# Patient Record
Sex: MALE | Race: WHITE | NOT HISPANIC OR LATINO | Employment: FULL TIME | URBAN - NONMETROPOLITAN AREA
[De-identification: names, ages, dates, MRNs, and addresses within clinical notes are randomized per-mention and may not be internally consistent; named-entity substitution may affect disease eponyms.]

---

## 2017-02-17 RX ORDER — SIMVASTATIN 10 MG
10 TABLET ORAL DAILY
Refills: 2 | COMMUNITY
Start: 2017-02-03

## 2017-02-17 RX ORDER — HYDROCODONE BITARTRATE AND ACETAMINOPHEN 7.5; 325 MG/1; MG/1
1 TABLET ORAL DAILY PRN
Refills: 0 | Status: ON HOLD | COMMUNITY
Start: 2017-02-03 | End: 2018-05-31

## 2017-02-17 RX ORDER — TIZANIDINE 4 MG/1
4 TABLET ORAL DAILY PRN
Refills: 2 | COMMUNITY
Start: 2017-02-03

## 2017-02-17 RX ORDER — GABAPENTIN 800 MG/1
800 TABLET ORAL DAILY
Refills: 2 | COMMUNITY
Start: 2017-02-03

## 2017-02-17 RX ORDER — ASPIRIN 81 MG/1
81 TABLET ORAL DAILY
COMMUNITY

## 2017-02-17 RX ORDER — LISINOPRIL 30 MG/1
30 TABLET ORAL DAILY
Refills: 2 | COMMUNITY
Start: 2017-02-03

## 2017-02-21 ENCOUNTER — OFFICE VISIT (OUTPATIENT)
Dept: NEUROSURGERY | Facility: CLINIC | Age: 42
End: 2017-02-21

## 2017-02-21 VITALS — WEIGHT: 265 LBS | HEIGHT: 73 IN | BODY MASS INDEX: 35.12 KG/M2 | TEMPERATURE: 98.4 F

## 2017-02-21 DIAGNOSIS — M47.816 FACET ARTHRITIS OF LUMBAR REGION: ICD-10-CM

## 2017-02-21 DIAGNOSIS — M51.36 BULGING LUMBAR DISC: Primary | ICD-10-CM

## 2017-02-21 DIAGNOSIS — M51.36 DEGENERATIVE DISC DISEASE, LUMBAR: ICD-10-CM

## 2017-02-21 PROCEDURE — 99243 OFF/OP CNSLTJ NEW/EST LOW 30: CPT | Performed by: NEUROLOGICAL SURGERY

## 2017-02-21 RX ORDER — MELOXICAM 15 MG/1
15 TABLET ORAL DAILY
Qty: 30 TABLET | Refills: 2 | Status: SHIPPED | OUTPATIENT
Start: 2017-02-21 | End: 2017-03-08

## 2017-02-21 NOTE — PROGRESS NOTES
Patient: Jin Lara  : 1975    Primary Care Provider: Jose Montejo MD    Requesting Provider: As above        History    Chief Complaint: Low back.    History of Present Illness: Mr. Lara is a 41-year-old [place]  who for 6-8 months has had thoracolumbar pain on the right that will extend in the hip and buttock at times.  He also complains of numbness in the right buttock and right posterior thigh.  None of his symptoms seem to extend below the knee.  He's been treated with Motrin which was not helpful.  He takes 1 Norco in the late afternoon or evening and that is helpful.  He has no arm, chest, or abdominal symptoms.  He's been on Neurontin for a couple of years due to neuropathy.  He has numbness in his feet.  He denies bowel or bladder dysfunction.  His current symptoms are worse on cold and rainy days.  He does better in the early morning but is worse as the day ensues.  He's also worse with walking, lifting, twisting.    Review of Systems   Constitutional: Positive for activity change and diaphoresis. Negative for appetite change, chills, fatigue, fever and unexpected weight change.   HENT: Positive for tinnitus. Negative for congestion, dental problem, drooling, ear discharge, ear pain, facial swelling, hearing loss, mouth sores, nosebleeds, postnasal drip, rhinorrhea, sinus pressure, sneezing, sore throat, trouble swallowing and voice change.    Eyes: Negative for photophobia, pain, discharge, redness, itching and visual disturbance.   Respiratory: Negative for apnea, cough, choking, chest tightness, shortness of breath, wheezing and stridor.    Cardiovascular: Negative for chest pain, palpitations and leg swelling.   Gastrointestinal: Negative for abdominal distention, abdominal pain, anal bleeding, blood in stool, constipation, diarrhea, nausea, rectal pain and vomiting.   Endocrine: Negative for cold intolerance, heat intolerance, polydipsia, polyphagia and polyuria.  "  Genitourinary: Negative for decreased urine volume, difficulty urinating, dysuria, enuresis, flank pain, frequency, genital sores, hematuria and urgency.   Musculoskeletal: Positive for back pain, neck pain and neck stiffness. Negative for arthralgias, gait problem, joint swelling and myalgias.   Skin: Negative for color change, pallor, rash and wound.   Allergic/Immunologic: Negative for environmental allergies, food allergies and immunocompromised state.   Neurological: Positive for numbness and headaches. Negative for dizziness, tremors, seizures, syncope, facial asymmetry, speech difficulty, weakness and light-headedness.   Hematological: Negative for adenopathy. Does not bruise/bleed easily.   Psychiatric/Behavioral: Positive for agitation and sleep disturbance. Negative for behavioral problems, confusion, decreased concentration, dysphoric mood, hallucinations, self-injury and suicidal ideas. The patient is not nervous/anxious and is not hyperactive.        The patient's past medical history, past surgical history, family history, and social history have been reviewed at length in the electronic medical record.    Physical Exam:   Visit Vitals   • Temp 98.4 °F (36.9 °C)   • Ht 73\" (185.4 cm)   • Wt 265 lb (120 kg)   • BMI 34.96 kg/m2     CONSTITUTIONAL: Patient is well-nourished, pleasant and appears stated age.  CV: Heart regular rate and rhythm without murmur, rub, or gallop.  PULMONARY: Lungs are clear to ascultation.  MUSCULOSKELETAL:  Straight leg raising is negative.  Dylan's Sign is negative.  ROM in back normal.  Tenderness in the back to palpation is mildly present in the right SI region.  NEUROLOGICAL:  Orientation, memory, attention span, language function, and cognition have been examined and are intact.  Strength is intact in the lower extremities to direct testing.  Muscle tone is normal throughout.  Station and gait are normal.  Sensation is intact to light touch testing throughout.  Deep " tendon reflexes are 1+ and symmetrical.  Coordination is intact.      Medical Decision Making    Data Review:   MRI of the lumbar spine demonstrates degenerative signal change in the low thoracic region.  There is also loss of signal in the L4-5 disc.  There is some broad-based disc or spurring at L4-5 more prominent leftward.  High-grade root or canal compromise is not noted.    Diagnosis:   Mechanical low back pain due to degenerative disc and degenerative joint disease.  At this juncture I do not believe that he has an active radiculopathy.    Treatment Options:   I have referred him to physical therapy.  I've also prescribed meloxicam.  He will follow-up in my clinic in 4-6 weeks to check on his progress.  If he continues to struggle then a referral to the pain clinic for a block or two would be a consideration.       Diagnosis Plan   1. Bulging lumbar disc  Ambulatory Referral to Physical Therapy Evaluate and treat    meloxicam (MOBIC) 15 MG tablet   2. Degenerative disc disease, lumbar     3. Facet arthritis of lumbar region         Scribed for Ross Lopes MD by Lydia Morris CMA on 02/21/2017 at 10:58 AM    I, Dr. Lopes, personally performed the services described in the documentation, as scribed in my presence, and it is both accurate and complete.

## 2017-03-08 ENCOUNTER — TELEPHONE (OUTPATIENT)
Dept: NEUROSURGERY | Facility: CLINIC | Age: 42
End: 2017-03-08

## 2017-03-08 RX ORDER — CELECOXIB 200 MG/1
200 CAPSULE ORAL DAILY
Qty: 30 CAPSULE | Refills: 1 | Status: SHIPPED | OUTPATIENT
Start: 2017-03-08 | End: 2017-06-13

## 2017-03-08 NOTE — TELEPHONE ENCOUNTER
Provider:  [facility]  Caller:   Jin  Phone #:  [phone]  Surgery:  n/a  Surgery Date:  n/a  Last visit:   2/21/17    BUDDY:         Reason for call:       Pt states that the meloxicam gives him headache within 1 hour of dosage, PT suggest that he D/C the medication until speaking with us about issue

## 2017-03-08 NOTE — TELEPHONE ENCOUNTER
We can try to change this medicine to Celebrex.   No be a very unlikely.  Side effects, but we will discontinue and try different anti-inflammatory.

## 2017-03-09 NOTE — TELEPHONE ENCOUNTER
Called pt and advised that we called in Celebrex in for him in place of the Meloxicam, he understood.     He said that within an hour of taking the meloxicam he would get a migraine headache, stopped taking it for 3 days and had no headache, then took another one, headache within an hour. I asked him if he would like me to add Meloxicam to his allergy list, he said yes.     Adv pt to give us a call after taking the Celebrex for a few days if he doesn't think it's helping and/or has any side effects, he understood.

## 2017-03-13 ENCOUNTER — TELEPHONE (OUTPATIENT)
Dept: NEUROSURGERY | Facility: CLINIC | Age: 42
End: 2017-03-13

## 2017-03-13 NOTE — TELEPHONE ENCOUNTER
"Provider:  [facility]  Caller:   Jin Lara  Phone #:  [phone]  Last visit: 02/21/17    Reason for call:       Pt is on 5th visit of PT, PT staff wanted him to call to tell us that it's \"not working for him and is aggravating his symptoms\", should he continue or stop PT?  "

## 2017-03-13 NOTE — TELEPHONE ENCOUNTER
I would like him to try to continue the exercises that are helping. Avoid the things that cause increased pain .

## 2017-03-14 NOTE — TELEPHONE ENCOUNTER
Spoke to patient. Advised him to continue PT.  He states it was the physical therapist who encouraged him to call us because nothing they are doing seems to help.  He takes OTC meds prn and Hydrocodone qd.  He c/o lower back and right leg pain.  He is attending therapy 2-3 days per week.  He said he will continue to go and will discuss with Dr. Lopes at follow up.  I advised him to call us back if anything changes.

## 2017-05-02 ENCOUNTER — OFFICE VISIT (OUTPATIENT)
Dept: NEUROSURGERY | Facility: CLINIC | Age: 42
End: 2017-05-02

## 2017-05-02 VITALS — TEMPERATURE: 98.9 F | HEIGHT: 73 IN | WEIGHT: 274 LBS | BODY MASS INDEX: 36.31 KG/M2

## 2017-05-02 DIAGNOSIS — G56.03 BILATERAL CARPAL TUNNEL SYNDROME: Primary | ICD-10-CM

## 2017-05-02 DIAGNOSIS — M51.36 DEGENERATIVE DISC DISEASE, LUMBAR: ICD-10-CM

## 2017-05-02 DIAGNOSIS — M51.36 BULGING LUMBAR DISC: ICD-10-CM

## 2017-05-02 DIAGNOSIS — M47.816 FACET ARTHRITIS OF LUMBAR REGION: ICD-10-CM

## 2017-05-02 PROCEDURE — 99213 OFFICE O/P EST LOW 20 MIN: CPT | Performed by: NEUROLOGICAL SURGERY

## 2017-05-02 RX ORDER — TESTOSTERONE CYPIONATE 200 MG/ML
300 INJECTION, SOLUTION INTRAMUSCULAR WEEKLY
Refills: 3 | Status: ON HOLD | COMMUNITY
Start: 2017-03-20 | End: 2018-05-31

## 2017-05-02 RX ORDER — IBUPROFEN 600 MG/1
1 TABLET ORAL AS NEEDED
Refills: 3 | COMMUNITY
Start: 2017-04-04

## 2017-06-12 ENCOUNTER — TELEPHONE (OUTPATIENT)
Dept: PAIN MEDICINE | Facility: CLINIC | Age: 42
End: 2017-06-12

## 2017-06-13 ENCOUNTER — OFFICE VISIT (OUTPATIENT)
Dept: PAIN MEDICINE | Facility: CLINIC | Age: 42
End: 2017-06-13

## 2017-06-13 VITALS
TEMPERATURE: 97.7 F | HEART RATE: 84 BPM | SYSTOLIC BLOOD PRESSURE: 160 MMHG | HEIGHT: 73 IN | RESPIRATION RATE: 18 BRPM | DIASTOLIC BLOOD PRESSURE: 100 MMHG | OXYGEN SATURATION: 98 % | WEIGHT: 270 LBS | BODY MASS INDEX: 35.78 KG/M2

## 2017-06-13 DIAGNOSIS — M48.061 SPINAL STENOSIS OF LUMBAR REGION: ICD-10-CM

## 2017-06-13 DIAGNOSIS — E10.42 DIABETIC POLYNEUROPATHY ASSOCIATED WITH TYPE 1 DIABETES MELLITUS (HCC): ICD-10-CM

## 2017-06-13 DIAGNOSIS — M47.816 SPONDYLOSIS OF LUMBAR REGION WITHOUT MYELOPATHY OR RADICULOPATHY: ICD-10-CM

## 2017-06-13 DIAGNOSIS — E10.8 DIABETES MELLITUS TYPE 1 WITH COMPLICATIONS (HCC): ICD-10-CM

## 2017-06-13 DIAGNOSIS — M48.061 LUMBAR FORAMINAL STENOSIS: ICD-10-CM

## 2017-06-13 DIAGNOSIS — I10 ESSENTIAL HYPERTENSION: ICD-10-CM

## 2017-06-13 DIAGNOSIS — M51.26 DISPLACEMENT OF LUMBAR INTERVERTEBRAL DISC WITHOUT MYELOPATHY: ICD-10-CM

## 2017-06-13 PROCEDURE — 99203 OFFICE O/P NEW LOW 30 MIN: CPT | Performed by: ANESTHESIOLOGY

## 2017-06-13 NOTE — PROGRESS NOTES
"Chief Complaint: \"Pain in my lower back.\"    History of Present Illness:   Patient: Mr. Jin Lara, 42 y.o. male   Referring physician: Dr. Ross Lopes  Reason for referral: Consultation for intractable chronic lower back pain.   Pain history: Patient reports a 3 years history of pain, which began without incident. 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 Pain has progressed in intensity over the past 1 year.   Pain description: Constant pain with intermittent exacerbation, described as aching, dull, numbing and tingling sensation.   Radiation of pain: The lower back pain radiates (about 2-3 times per week) down into the posterior aspect of his right thigh to the knee level. On rare occassions, pain radiates into the right heel, although patient feels that this could be from his neuropathy. Patient also reports diabetes polyneuropathy in his feet with severe burning and pins and needle sensation.  Pain intensity today: 4/10  Average pain intensity last week: 3/10  Pain intensity ranges from: 2/10 to 8/10  Aggravating factors: Pain increases with twisting, bending, and standing longer than 15 minutes.  Alleviating factors: Pain decreases with lying down.   Associated symptoms:   Patient reports pain and numbness in the posterior aspect of his right thigh but denies weakness.   Patient reports any new bladder problems.   Patient denies difficulties with his balance or recent falls.     Review of previous therapies and additional medical records:  Jin Lara has already failed the following measures, including:   Conservative measures: oral analgesics, opioids, topical analgesics, physical therapy, ice and heat   Interventional measures: None  Surgical measures: No previous lumbar surgery   Jin Lara underwent neurosurgical consultation with Dr. Lopes on 05/02/2016, and was found not to be a surgical candidate at this " time.  Jin Lara presents with significant comorbidities, particulary IDDM (insulin pump) controlled by Dr. Norah Jefferson.  In terms of current analgesics, Jin Lara takes: Lortab 7.5 mg 2 times a day, gabapentin 800 mg 1 time a day.   I have reviewed [facility] Report #[id number] consistent to medication reconciliation.    Review of diagnostic Studies:    MRI of the lumbar spine 02/07/0217, (report/disc not available) revealed lumbar facet arthropathy from L3-L4 through L5-S1. L4-L5 disc with broad-based disc protrusion more prominent leftward. Moderate canal stenosis and mild bilateral neuroforaminal stenosis  CT lumbar spine January 13, 2017 (report/disc not available) revealed multilevel degenerative disc disease and facet arthropathy.  L4-L5 broad-based disc protrusion.  Bilateral facet hypertrophy and severe spinal stenosis.  Mild to moderate right and mild left neuroforaminal stenosis.  L5-S1 moderately severe bilateral neuroforaminal stenosis.    Review of Systems   Constitutional: Positive for activity change and diaphoresis.   Cardiovascular: Positive for leg swelling.   Endocrine: Positive for cold intolerance.   Musculoskeletal: Positive for back pain, neck pain and neck stiffness.   Neurological: Positive for dizziness, light-headedness, numbness and headaches.   Psychiatric/Behavioral: Positive for sleep disturbance.   All other systems reviewed and are negative.     Patient Active Problem List   Diagnosis   • Spondylosis of lumbar region without myelopathy or radiculopathy   • Spinal stenosis of lumbar region   • Displacement of lumbar intervertebral disc without myelopathy   • Diabetes mellitus type 1 with complications   • Essential hypertension   • Diabetic polyneuropathy associated with type 1 diabetes mellitus   • Lumbar foraminal stenosis       Past Medical History:   Diagnosis Date   • Color blind    • Diabetes mellitus    • Enlarged thyroid    • Extremity pain    • Hypertension    • Low  "back pain    • Vitamin D deficiency          Past Surgical History:   Procedure Laterality Date   • ADENOIDECTOMY     • ANAL FISTULA REPAIR  2010   • EAR TUBES Bilateral          Family History   Problem Relation Age of Onset   • Thyroid disease Mother    • Arthritis Mother    • Cancer Maternal Aunt          Social History     Social History   • Marital status:      Spouse name: N/A   • Number of children: N/A   • Years of education: N/A     Social History Main Topics   • Smoking status: Never Smoker   • Smokeless tobacco: Never Used   • Alcohol use Yes   • Drug use: No   • Sexual activity: Not Asked     Other Topics Concern   • None     Social History Narrative        Current Outpatient Prescriptions:   •  aspirin 81 MG EC tablet, Take 81 mg by mouth Daily., Disp: , Rfl:   •  DINORA CONTOUR NEXT TEST test strip, , Disp: , Rfl: 2  •  gabapentin (NEURONTIN) 800 MG tablet, Take 800 mg by mouth Daily., Disp: , Rfl: 2  •  GLUCAGON EMERGENCY 1 MG injection, Inject 1 mg under the skin As Needed., Disp: , Rfl: 3  •  HYDROcodone-acetaminophen (NORCO) 7.5-325 MG per tablet, Take 1 tablet by mouth Daily As Needed., Disp: , Rfl: 0  •  lisinopril (PRINIVIL,ZESTRIL) 30 MG tablet, Take 30 mg by mouth Daily., Disp: , Rfl: 2  •  simvastatin (ZOCOR) 10 MG tablet, Take 10 mg by mouth Daily., Disp: , Rfl: 2  •  Testosterone Cypionate (DEPOTESTOTERONE CYPIONATE) 200 MG/ML injection, Inject 300 mg into the shoulder, thigh, or buttocks 1 (One) Time Per Week., Disp: , Rfl: 3  •  tiZANidine (ZANAFLEX) 4 MG tablet, Take 4 mg by mouth Daily As Needed., Disp: , Rfl: 2      Allergies   Allergen Reactions   • Meloxicam Other (See Comments)     Migraine Headache         /100 (BP Location: Left arm, Patient Position: Sitting)  Pulse 84  Temp 97.7 °F (36.5 °C) (Temporal Artery )   Resp 18  Ht 73\" (185.4 cm)  Wt 270 lb (122 kg)  SpO2 98%  BMI 35.62 kg/m2      Physical Exam:  Constitutional: Patient is oriented to person, place, " and time. Vital signs are normal. Patient appears well-developed and well-nourished.   HENT: Head: Normocephalic and atraumatic. Eyes: Conjunctivae and lids are normal. Pupils: Equal, round, reactive to light.   Neck: Trachea normal. Neck supple. No JVD present.   Pulmonary Respiratory effort: No increased work of breathing or signs of respiratory distress. Auscultation of lungs: Clear to auscultation.   Cardiovascular Auscultation of heart: Normal rate and rhythm, normal S1 and S2, no murmurs.   Peripheral vascular exam: Normal. No edema.   Abdomen: The abdomen was soft and nontender. Bowel sounds were normal.   Musculoskeletal   Gait and station: Gait evaluation demonstrated a normal gait.   Lumbar spine: The range of motion of the lumbar spine is limited secondary to pain. Extension and rotation of the lumbar spine increased and reproduced pain. Lumbar facet joint loading maneuvers are positive.  Dylan and Gaenslen's tests are negative   Piriformis maneuvers are negative   Palpation of the bilateral ischial tuberosities, unrevealing   Palpation of the bilateral greater trochanter, unrevealing   Examination of the Iliotibial band: unrevealing   Hip joints: The range of motion of the hip joints is full and without pain   Neurological: Patient is alert and oriented to person, place, and time. Speech: speech is normal. Cortical function: Normal mental status.   Cranial nerves: Cranial nerves 2-12 intact.   Reflex Scores:  Right patellar: 1+  Left patellar: 1+  Right achilles: 1+  Left achilles: 1+  Motor strength: 5/5  Motor Tone: normal tone.   Involuntary movements: none.   Superficial/Primitive Reflexes: primitive reflexes were absent.   Right Guevara: absent  Left Guevara: absent  Right ankle clonus: absent  Left ankle clonus: absent   No nuchal rigidity. Negative Kernig and Brudzinski.  Spurling sign is negative. Lhermitte sign is negative. Negative long tract signs. Straight leg raising test is negative.  Femoral stretch sign is negative.   Sensation: No sensory loss. Sensory exam: intact to light touch, intact pain and temperature sensation, intact vibration sensation and normal proprioception.   Coordination: Normal finger to nose and heel to shin. Normal balance and negative. Romberg's sign negative.   Skin and subcutaneous tissue: Skin is warm and intact. No rash noted. No cyanosis.   Psychiatric:   Judgment and insight: Normal.   Orientation to person, place and time: Normal.   Recent and remote memory: Intact.   Mood and affect: Normal.     ASSESSMENT:   1. Spondylosis of lumbar region without myelopathy or radiculopathy    2. Displacement of lumbar intervertebral disc without myelopathy    3. Spinal stenosis of lumbar region    4. Lumbar foraminal stenosis    5. Diabetes mellitus type 1 with complications    6. Diabetic polyneuropathy associated with type 1 diabetes mellitus    7. Essential hypertension      PLAN/MEDICAL DECISION MAKING:  I had a lengthy conversation with . Jin Lara regarding his chronic pain condition and potential therapeutic options including risks, benefits, alternative therapies, to name a few. Patient has failed to obtain pain relief with conservative measures, as referenced above. Patient has been found not to be a surgical candidate for his low back. Patient presents with lumbar facet arthropathy from L3-L4 through L5-S1. At L4-L5 disc there is broad-based disc protrusion more prominent leftward. Moderate canal stenosis and mild bilateral neuroforaminal stenosis. I have reviewed all available patient's medical records as well as previous therapies as referenced above.  Therefore, I have proposed the following plan:  1. Long-term rehabilitation efforts:  A. The patient does not have a history of falls. I did complete a risk assessment for falls.   B. Patient will start a comprehensive physical therapy program for water therapy, core strengthening, gait and balance training,  neurodynamics, myofascial release, cupping and dry needling once pain is under control  C. Start an exercise program such as yoga, water therapy and daily walks for fitness  2. Pharmacological measures: Reviewed his pharmacological regimen. Patient takes gabapentin and hydrocodone. Due to work demands, I have not made any additional recommendations  3. Interventional pain management measures: Patient will be scheduled for diagnostic bilateral lumbar medial branch blocks at L2, L3, L4, L5; for bilateral lumbar facet joints at L3-L4, L4-L5, L5-S1 to clarify the origin of chronic refractory mechanical lower back pain. If patient experiences more than 80% relief along with significant improvement the range of motion of the lumbar spine, then, patient will be scheduled for a second set of diagnostic bilateral lumbar medial branch blocks, to then, proceed with bilateral lumbar medial branch rhizotomies. Otherwise, we will proceed with diagnostic and therapeutic right L4-L5 and right L5-S1 transforaminal epidural steroid injection. We may repeat another epidural depending on patient's outcome.  Patient will follow-up with Dr. Lopes thereafter.  4. The patient has been instructed to contact my office with any questions or difficulties. The patient understands the plan and agrees to proceed accordingly.       Patient Care Team:  Jose Montejo MD as PCP - General (Family Medicine)  Rashid Jefferson MD as Consulting Physician (Endocrinology)  Ross Lopes MD as Surgeon (Neurosurgery)  Brendon Puckett MD as Consulting Physician (Pain Medicine)     No orders of the defined types were placed in this encounter.        Future Appointments  Date Time Provider Department [place]   6/23/2017 9:00 AM Ross Lopes MD [facility] None         Brendon Puckett MD     EMR Dragon/Transcription disclaimer:  Much of this encounter note is an electronic transcription of spoken language to printed text. Electronic  transcription of spoken language may permit erroneous, or at times, nonsensical words or phrases to be inadvertently transcribed. Although I have reviewed the note for such errors, some may still exist.

## 2017-06-26 ENCOUNTER — OUTSIDE FACILITY SERVICE (OUTPATIENT)
Dept: PAIN MEDICINE | Facility: CLINIC | Age: 42
End: 2017-06-26

## 2017-06-26 PROCEDURE — 64493 INJ PARAVERT F JNT L/S 1 LEV: CPT | Performed by: ANESTHESIOLOGY

## 2017-06-26 PROCEDURE — 64494 INJ PARAVERT F JNT L/S 2 LEV: CPT | Performed by: ANESTHESIOLOGY

## 2017-06-26 PROCEDURE — 99152 MOD SED SAME PHYS/QHP 5/>YRS: CPT | Performed by: ANESTHESIOLOGY

## 2017-06-26 PROCEDURE — 64495 INJ PARAVERT F JNT L/S 3 LEV: CPT | Performed by: ANESTHESIOLOGY

## 2017-07-17 ENCOUNTER — OUTSIDE FACILITY SERVICE (OUTPATIENT)
Dept: PAIN MEDICINE | Facility: CLINIC | Age: 42
End: 2017-07-17

## 2017-07-17 PROCEDURE — 64495 INJ PARAVERT F JNT L/S 3 LEV: CPT | Performed by: ANESTHESIOLOGY

## 2017-07-17 PROCEDURE — 64493 INJ PARAVERT F JNT L/S 1 LEV: CPT | Performed by: ANESTHESIOLOGY

## 2017-07-17 PROCEDURE — 64494 INJ PARAVERT F JNT L/S 2 LEV: CPT | Performed by: ANESTHESIOLOGY

## 2017-07-17 PROCEDURE — 99152 MOD SED SAME PHYS/QHP 5/>YRS: CPT | Performed by: ANESTHESIOLOGY

## 2017-07-31 ENCOUNTER — OUTSIDE FACILITY SERVICE (OUTPATIENT)
Dept: PAIN MEDICINE | Facility: CLINIC | Age: 42
End: 2017-07-31

## 2017-07-31 PROCEDURE — 64636 DESTROY L/S FACET JNT ADDL: CPT | Performed by: ANESTHESIOLOGY

## 2017-07-31 PROCEDURE — 99152 MOD SED SAME PHYS/QHP 5/>YRS: CPT | Performed by: ANESTHESIOLOGY

## 2017-07-31 PROCEDURE — 64635 DESTROY LUMB/SAC FACET JNT: CPT | Performed by: ANESTHESIOLOGY

## 2017-08-29 ENCOUNTER — TELEPHONE (OUTPATIENT)
Dept: PAIN MEDICINE | Facility: CLINIC | Age: 42
End: 2017-08-29

## 2018-05-30 ENCOUNTER — ANESTHESIA EVENT (OUTPATIENT)
Dept: PERIOP | Facility: HOSPITAL | Age: 43
End: 2018-05-30

## 2018-05-31 ENCOUNTER — ANESTHESIA (OUTPATIENT)
Dept: PERIOP | Facility: HOSPITAL | Age: 43
End: 2018-05-31

## 2018-05-31 ENCOUNTER — HOSPITAL ENCOUNTER (OUTPATIENT)
Facility: HOSPITAL | Age: 43
Setting detail: HOSPITAL OUTPATIENT SURGERY
Discharge: HOME | End: 2018-05-31
Attending: UROLOGY | Admitting: UROLOGY

## 2018-05-31 VITALS
HEIGHT: 73 IN | DIASTOLIC BLOOD PRESSURE: 68 MMHG | RESPIRATION RATE: 16 BRPM | SYSTOLIC BLOOD PRESSURE: 129 MMHG | HEART RATE: 72 BPM | BODY MASS INDEX: 37.11 KG/M2 | WEIGHT: 280 LBS | TEMPERATURE: 97.8 F | OXYGEN SATURATION: 99 %

## 2018-05-31 LAB
ALBUMIN SERPL-MCNC: 4.4 G/DL (ref 3.2–4.8)
ALBUMIN/GLOB SERPL: 1.4 G/DL (ref 1.5–2.5)
ALP SERPL-CCNC: 72 U/L (ref 25–100)
ALT SERPL W P-5'-P-CCNC: 46 U/L (ref 7–40)
ANION GAP SERPL CALCULATED.3IONS-SCNC: 8 MMOL/L (ref 3–11)
AST SERPL-CCNC: 35 U/L (ref 0–33)
BACTERIA UR QL AUTO: ABNORMAL /HPF
BASOPHILS # BLD AUTO: 0.05 10*3/MM3 (ref 0–0.2)
BASOPHILS NFR BLD AUTO: 0.9 % (ref 0–1)
BILIRUB SERPL-MCNC: 0.4 MG/DL (ref 0.3–1.2)
BILIRUB UR QL STRIP: NEGATIVE
BUN BLD-MCNC: 26 MG/DL (ref 9–23)
BUN/CREAT SERPL: 26 (ref 7–25)
CALCIUM SPEC-SCNC: 9.1 MG/DL (ref 8.7–10.4)
CHLORIDE SERPL-SCNC: 104 MMOL/L (ref 99–109)
CLARITY UR: CLEAR
CO2 SERPL-SCNC: 24 MMOL/L (ref 20–31)
COLOR UR: YELLOW
CREAT BLD-MCNC: 1 MG/DL (ref 0.6–1.3)
DEPRECATED RDW RBC AUTO: 40.8 FL (ref 37–54)
EOSINOPHIL # BLD AUTO: 0.29 10*3/MM3 (ref 0–0.3)
EOSINOPHIL NFR BLD AUTO: 4.9 % (ref 0–3)
ERYTHROCYTE [DISTWIDTH] IN BLOOD BY AUTOMATED COUNT: 13.7 % (ref 11.3–14.5)
GFR SERPL CREATININE-BSD FRML MDRD: 82 ML/MIN/1.73
GLOBULIN UR ELPH-MCNC: 3.1 GM/DL
GLUCOSE BLD-MCNC: 169 MG/DL (ref 70–100)
GLUCOSE BLDC GLUCOMTR-MCNC: 155 MG/DL (ref 70–130)
GLUCOSE UR STRIP-MCNC: ABNORMAL MG/DL
HCT VFR BLD AUTO: 50.2 % (ref 38.9–50.9)
HGB BLD-MCNC: 16.9 G/DL (ref 13.1–17.5)
HGB UR QL STRIP.AUTO: NEGATIVE
HYALINE CASTS UR QL AUTO: ABNORMAL /LPF
IMM GRANULOCYTES # BLD: 0.01 10*3/MM3 (ref 0–0.03)
IMM GRANULOCYTES NFR BLD: 0.2 % (ref 0–0.6)
KETONES UR QL STRIP: ABNORMAL
LEUKOCYTE ESTERASE UR QL STRIP.AUTO: ABNORMAL
LYMPHOCYTES # BLD AUTO: 1.57 10*3/MM3 (ref 0.6–4.8)
LYMPHOCYTES NFR BLD AUTO: 26.8 % (ref 24–44)
MCH RBC QN AUTO: 27.6 PG (ref 27–31)
MCHC RBC AUTO-ENTMCNC: 33.7 G/DL (ref 32–36)
MCV RBC AUTO: 81.9 FL (ref 80–99)
MONOCYTES # BLD AUTO: 0.56 10*3/MM3 (ref 0–1)
MONOCYTES NFR BLD AUTO: 9.6 % (ref 0–12)
NEUTROPHILS # BLD AUTO: 3.38 10*3/MM3 (ref 1.5–8.3)
NEUTROPHILS NFR BLD AUTO: 57.6 % (ref 41–71)
NITRITE UR QL STRIP: NEGATIVE
PH UR STRIP.AUTO: 6 [PH] (ref 5–8)
PLATELET # BLD AUTO: 180 10*3/MM3 (ref 150–450)
PMV BLD AUTO: 9.9 FL (ref 6–12)
POTASSIUM BLD-SCNC: 4.1 MMOL/L (ref 3.5–5.5)
PROT SERPL-MCNC: 7.5 G/DL (ref 5.7–8.2)
PROT UR QL STRIP: ABNORMAL
RBC # BLD AUTO: 6.13 10*6/MM3 (ref 4.2–5.76)
RBC # UR: ABNORMAL /HPF
REF LAB TEST METHOD: ABNORMAL
SODIUM BLD-SCNC: 136 MMOL/L (ref 132–146)
SP GR UR STRIP: 1.03 (ref 1–1.03)
SQUAMOUS #/AREA URNS HPF: ABNORMAL /HPF
UROBILINOGEN UR QL STRIP: ABNORMAL
WBC NRBC COR # BLD: 5.86 10*3/MM3 (ref 3.5–10.8)
WBC UR QL AUTO: ABNORMAL /HPF

## 2018-05-31 PROCEDURE — 25010000002 PROPOFOL 10 MG/ML EMULSION: Performed by: NURSE ANESTHETIST, CERTIFIED REGISTERED

## 2018-05-31 PROCEDURE — 93005 ELECTROCARDIOGRAM TRACING: CPT | Performed by: ANESTHESIOLOGY

## 2018-05-31 PROCEDURE — 25010000002 NEOSTIGMINE 10 MG/10ML SOLUTION: Performed by: NURSE ANESTHETIST, CERTIFIED REGISTERED

## 2018-05-31 PROCEDURE — 81001 URINALYSIS AUTO W/SCOPE: CPT | Performed by: UROLOGY

## 2018-05-31 PROCEDURE — 80053 COMPREHEN METABOLIC PANEL: CPT | Performed by: UROLOGY

## 2018-05-31 PROCEDURE — 25010000002 FENTANYL CITRATE (PF) 100 MCG/2ML SOLUTION: Performed by: NURSE ANESTHETIST, CERTIFIED REGISTERED

## 2018-05-31 PROCEDURE — 82962 GLUCOSE BLOOD TEST: CPT

## 2018-05-31 PROCEDURE — 25010000002 PROPOFOL 1000 MG/ML EMULSION: Performed by: NURSE ANESTHETIST, CERTIFIED REGISTERED

## 2018-05-31 PROCEDURE — 25010000002 DEXAMETHASONE PER 1 MG: Performed by: NURSE ANESTHETIST, CERTIFIED REGISTERED

## 2018-05-31 PROCEDURE — 85025 COMPLETE CBC W/AUTO DIFF WBC: CPT | Performed by: UROLOGY

## 2018-05-31 PROCEDURE — 25010000002 HYDROMORPHONE PER 4 MG: Performed by: NURSE ANESTHETIST, CERTIFIED REGISTERED

## 2018-05-31 RX ORDER — HYDROCODONE BITARTRATE AND ACETAMINOPHEN 7.5; 325 MG/1; MG/1
1 TABLET ORAL EVERY 6 HOURS PRN
Qty: 20 TABLET | Refills: 0 | Status: SHIPPED | OUTPATIENT
Start: 2018-05-31

## 2018-05-31 RX ORDER — LEVOFLOXACIN 5 MG/ML
500 INJECTION, SOLUTION INTRAVENOUS ONCE
Status: DISCONTINUED | OUTPATIENT
Start: 2018-05-31 | End: 2018-05-31 | Stop reason: HOSPADM

## 2018-05-31 RX ORDER — PROMETHAZINE HYDROCHLORIDE 25 MG/1
25 SUPPOSITORY RECTAL ONCE AS NEEDED
Status: DISCONTINUED | OUTPATIENT
Start: 2018-05-31 | End: 2018-05-31 | Stop reason: HOSPADM

## 2018-05-31 RX ORDER — HYDROMORPHONE HYDROCHLORIDE 1 MG/ML
0.5 INJECTION, SOLUTION INTRAMUSCULAR; INTRAVENOUS; SUBCUTANEOUS
Status: DISCONTINUED | OUTPATIENT
Start: 2018-05-31 | End: 2018-05-31 | Stop reason: HOSPADM

## 2018-05-31 RX ORDER — SCOLOPAMINE TRANSDERMAL SYSTEM 1 MG/1
1 PATCH, EXTENDED RELEASE TRANSDERMAL ONCE
Status: DISCONTINUED | OUTPATIENT
Start: 2018-05-31 | End: 2018-05-31 | Stop reason: HOSPADM

## 2018-05-31 RX ORDER — GLYCOPYRROLATE 0.2 MG/ML
INJECTION INTRAMUSCULAR; INTRAVENOUS AS NEEDED
Status: DISCONTINUED | OUTPATIENT
Start: 2018-05-31 | End: 2018-05-31 | Stop reason: SURG

## 2018-05-31 RX ORDER — LIDOCAINE HYDROCHLORIDE 10 MG/ML
0.5 INJECTION, SOLUTION EPIDURAL; INFILTRATION; INTRACAUDAL; PERINEURAL ONCE AS NEEDED
Status: COMPLETED | OUTPATIENT
Start: 2018-05-31 | End: 2018-05-31

## 2018-05-31 RX ORDER — PROMETHAZINE HYDROCHLORIDE 25 MG/ML
12.5 INJECTION, SOLUTION INTRAMUSCULAR; INTRAVENOUS ONCE AS NEEDED
Status: DISCONTINUED | OUTPATIENT
Start: 2018-05-31 | End: 2018-05-31 | Stop reason: HOSPADM

## 2018-05-31 RX ORDER — PROMETHAZINE HYDROCHLORIDE 25 MG/1
25 TABLET ORAL ONCE AS NEEDED
Status: DISCONTINUED | OUTPATIENT
Start: 2018-05-31 | End: 2018-05-31 | Stop reason: HOSPADM

## 2018-05-31 RX ORDER — ATRACURIUM BESYLATE 10 MG/ML
INJECTION, SOLUTION INTRAVENOUS AS NEEDED
Status: DISCONTINUED | OUTPATIENT
Start: 2018-05-31 | End: 2018-05-31 | Stop reason: SURG

## 2018-05-31 RX ORDER — PROPOFOL 10 MG/ML
VIAL (ML) INTRAVENOUS AS NEEDED
Status: DISCONTINUED | OUTPATIENT
Start: 2018-05-31 | End: 2018-05-31 | Stop reason: SURG

## 2018-05-31 RX ORDER — FENTANYL CITRATE 50 UG/ML
50 INJECTION, SOLUTION INTRAMUSCULAR; INTRAVENOUS
Status: DISCONTINUED | OUTPATIENT
Start: 2018-05-31 | End: 2018-05-31 | Stop reason: HOSPADM

## 2018-05-31 RX ORDER — FENTANYL CITRATE 50 UG/ML
INJECTION, SOLUTION INTRAMUSCULAR; INTRAVENOUS AS NEEDED
Status: DISCONTINUED | OUTPATIENT
Start: 2018-05-31 | End: 2018-05-31 | Stop reason: SURG

## 2018-05-31 RX ORDER — SODIUM CHLORIDE 0.9 % (FLUSH) 0.9 %
1-10 SYRINGE (ML) INJECTION AS NEEDED
Status: DISCONTINUED | OUTPATIENT
Start: 2018-05-31 | End: 2018-05-31 | Stop reason: HOSPADM

## 2018-05-31 RX ORDER — DEXAMETHASONE SODIUM PHOSPHATE 4 MG/ML
INJECTION, SOLUTION INTRA-ARTICULAR; INTRALESIONAL; INTRAMUSCULAR; INTRAVENOUS; SOFT TISSUE AS NEEDED
Status: DISCONTINUED | OUTPATIENT
Start: 2018-05-31 | End: 2018-05-31 | Stop reason: SURG

## 2018-05-31 RX ORDER — LIDOCAINE HYDROCHLORIDE 10 MG/ML
INJECTION, SOLUTION EPIDURAL; INFILTRATION; INTRACAUDAL; PERINEURAL AS NEEDED
Status: DISCONTINUED | OUTPATIENT
Start: 2018-05-31 | End: 2018-05-31 | Stop reason: SURG

## 2018-05-31 RX ORDER — ONDANSETRON 2 MG/ML
4 INJECTION INTRAMUSCULAR; INTRAVENOUS ONCE AS NEEDED
Status: DISCONTINUED | OUTPATIENT
Start: 2018-05-31 | End: 2018-05-31 | Stop reason: HOSPADM

## 2018-05-31 RX ORDER — HYDROCODONE BITARTRATE AND ACETAMINOPHEN 7.5; 325 MG/1; MG/1
1 TABLET ORAL EVERY 6 HOURS PRN
Qty: 15 TABLET | Refills: 0 | Status: SHIPPED | OUTPATIENT
Start: 2018-05-31 | End: 2018-05-31

## 2018-05-31 RX ORDER — HYDROCODONE BITARTRATE AND ACETAMINOPHEN 7.5; 325 MG/1; MG/1
1 TABLET ORAL ONCE AS NEEDED
Status: COMPLETED | OUTPATIENT
Start: 2018-05-31 | End: 2018-05-31

## 2018-05-31 RX ORDER — SODIUM CHLORIDE, SODIUM LACTATE, POTASSIUM CHLORIDE, CALCIUM CHLORIDE 600; 310; 30; 20 MG/100ML; MG/100ML; MG/100ML; MG/100ML
9 INJECTION, SOLUTION INTRAVENOUS CONTINUOUS
Status: DISCONTINUED | OUTPATIENT
Start: 2018-05-31 | End: 2018-05-31 | Stop reason: HOSPADM

## 2018-05-31 RX ORDER — NEOSTIGMINE METHYLSULFATE 1 MG/ML
INJECTION, SOLUTION INTRAVENOUS AS NEEDED
Status: DISCONTINUED | OUTPATIENT
Start: 2018-05-31 | End: 2018-05-31 | Stop reason: SURG

## 2018-05-31 RX ORDER — GLYCINE 1.5 G/100ML
SOLUTION IRRIGATION AS NEEDED
Status: DISCONTINUED | OUTPATIENT
Start: 2018-05-31 | End: 2018-05-31 | Stop reason: HOSPADM

## 2018-05-31 RX ORDER — FAMOTIDINE 20 MG/1
20 TABLET, FILM COATED ORAL ONCE
Status: COMPLETED | OUTPATIENT
Start: 2018-05-31 | End: 2018-05-31

## 2018-05-31 RX ORDER — ALFUZOSIN HYDROCHLORIDE 10 MG/1
10 TABLET, EXTENDED RELEASE ORAL DAILY
COMMUNITY

## 2018-05-31 RX ADMIN — SODIUM CHLORIDE, POTASSIUM CHLORIDE, SODIUM LACTATE AND CALCIUM CHLORIDE 9 ML/HR: 600; 310; 30; 20 INJECTION, SOLUTION INTRAVENOUS at 07:37

## 2018-05-31 RX ADMIN — FENTANYL CITRATE 50 MCG: 50 INJECTION, SOLUTION INTRAMUSCULAR; INTRAVENOUS at 09:38

## 2018-05-31 RX ADMIN — NEOSTIGMINE METHYLSULFATE 2.5 MG: 1 INJECTION, SOLUTION INTRAVENOUS at 10:07

## 2018-05-31 RX ADMIN — PROPOFOL 25 MCG/KG/MIN: 10 INJECTION, EMULSION INTRAVENOUS at 09:41

## 2018-05-31 RX ADMIN — NEOSTIGMINE METHYLSULFATE 1 MG: 1 INJECTION, SOLUTION INTRAVENOUS at 10:16

## 2018-05-31 RX ADMIN — ATRACURIUM BESYLATE 50 MG: 10 INJECTION, SOLUTION INTRAVENOUS at 09:38

## 2018-05-31 RX ADMIN — LIDOCAINE HYDROCHLORIDE 50 MG: 10 INJECTION, SOLUTION EPIDURAL; INFILTRATION; INTRACAUDAL; PERINEURAL at 09:38

## 2018-05-31 RX ADMIN — FENTANYL CITRATE 50 MCG: 50 INJECTION, SOLUTION INTRAMUSCULAR; INTRAVENOUS at 10:40

## 2018-05-31 RX ADMIN — PROPOFOL 200 MG: 10 INJECTION, EMULSION INTRAVENOUS at 09:38

## 2018-05-31 RX ADMIN — GLYCOPYRROLATE 0.4 MG: 0.2 INJECTION, SOLUTION INTRAMUSCULAR; INTRAVENOUS at 10:07

## 2018-05-31 RX ADMIN — LIDOCAINE HYDROCHLORIDE 0.5 ML: 10 INJECTION, SOLUTION EPIDURAL; INFILTRATION; INTRACAUDAL; PERINEURAL at 07:37

## 2018-05-31 RX ADMIN — HYDROMORPHONE HYDROCHLORIDE 0.5 MG: 1 INJECTION, SOLUTION INTRAMUSCULAR; INTRAVENOUS; SUBCUTANEOUS at 10:58

## 2018-05-31 RX ADMIN — FAMOTIDINE 20 MG: 20 TABLET ORAL at 07:41

## 2018-05-31 RX ADMIN — GLYCOPYRROLATE 0.1 MG: 0.2 INJECTION, SOLUTION INTRAMUSCULAR; INTRAVENOUS at 10:16

## 2018-05-31 RX ADMIN — SCOPALAMINE 1 PATCH: 1 PATCH, EXTENDED RELEASE TRANSDERMAL at 07:48

## 2018-05-31 RX ADMIN — DEXAMETHASONE SODIUM PHOSPHATE 8 MG: 4 INJECTION, SOLUTION INTRAMUSCULAR; INTRAVENOUS at 09:43

## 2018-05-31 RX ADMIN — FENTANYL CITRATE 50 MCG: 50 INJECTION, SOLUTION INTRAMUSCULAR; INTRAVENOUS at 10:26

## 2018-05-31 RX ADMIN — HYDROCODONE BITARTRATE AND ACETAMINOPHEN 1 TABLET: 7.5; 325 TABLET ORAL at 11:48

## 2018-05-31 NOTE — ANESTHESIA PREPROCEDURE EVALUATION
Anesthesia Evaluation     Patient summary reviewed and Nursing notes reviewed   history of anesthetic complications: PONV  NPO Solid Status: > 8 hours  NPO Liquid Status: > 8 hours           Airway   Mallampati: II  TM distance: >3 FB  Neck ROM: full  No difficulty expected  Dental - normal exam     Pulmonary - negative pulmonary ROS and normal exam    breath sounds clear to auscultation  Cardiovascular - normal exam    ECG reviewed  Rhythm: regular  Rate: normal    (+) hypertension,       Neuro/Psych- negative ROS  GI/Hepatic/Renal/Endo    (+) obesity,   diabetes mellitus (Insulin pump - on basal) type 2 using insulin,     ROS Comment: BPH    Musculoskeletal     (+) arthralgias,   Abdominal    Substance History      OB/GYN          Other   (+) arthritis                   Anesthesia Plan    ASA 3     general   (Subhypnotic propofol for PONV prophylaxis)  intravenous induction   Anesthetic plan and risks discussed with patient.    Plan discussed with CRNA.

## 2018-05-31 NOTE — ANESTHESIA POSTPROCEDURE EVALUATION
Patient: Jin Lara    Procedure Summary     Date:  05/31/18 Room / Location:   [facility] OR 07 /  [facility]    Anesthesia Start:  0928 Anesthesia Stop:      Procedure:  incision of bladder neck contracture (N/A ) Diagnosis:      Surgeon:  Yogi Salinas MD Provider:  Galileo Santiago MD    Anesthesia Type:  general ASA Status:  3          Anesthesia Type: general  Last vitals  /69  147/75 (05/31/18 0733)   Temp 97.4  98.4 °F (36.9 °C) (05/31/18 0733)   Pulse 76  88 (05/31/18 0733)   Resp 16  18 (05/31/18 0733)     SpO2 98  96 % (05/31/18 0733)     Post Anesthesia Care and Evaluation    Patient location during evaluation: PACU  Patient participation: complete - patient participated  Level of consciousness: awake and alert  Pain score: 0  Pain management: adequate  Airway patency: patent  Anesthetic complications: No anesthetic complications  PONV Status: none  Cardiovascular status: hemodynamically stable and acceptable  Respiratory status: nonlabored ventilation, acceptable and nasal cannula  Hydration status: acceptable

## 2018-05-31 NOTE — ANESTHESIA PROCEDURE NOTES
Airway  Urgency: elective    Airway not difficult    General Information and Staff    Patient location during procedure: OR    Indications and Patient Condition  Indications for airway management: airway protection    Preoxygenated: yes  MILS not maintained throughout  Mask difficulty assessment: 1 - vent by mask    Final Airway Details  Final airway type: endotracheal airway      Successful airway: ETT  Cuffed: yes   Successful intubation technique: direct laryngoscopy  Endotracheal tube insertion site: oral  Blade: Mago  Blade size: #3  ETT size: 7.5 mm  Cormack-Lehane Classification: grade I - full view of glottis  Placement verified by: chest auscultation and capnometry   Measured from: lips  ETT to lips (cm): 22  Number of attempts at approach: 1    Additional Comments  Negative epigastric sounds, Breath sound equal bilaterally with symmetric chest rise and fall

## 2018-06-20 RX ORDER — DICLOFENAC SODIUM 75 MG/1
75 TABLET, DELAYED RELEASE ORAL 2 TIMES DAILY
COMMUNITY

## 2018-06-20 RX ORDER — METOPROLOL SUCCINATE 50 MG/1
50 TABLET, EXTENDED RELEASE ORAL DAILY
COMMUNITY

## 2018-06-22 ENCOUNTER — OFFICE VISIT (OUTPATIENT)
Dept: NEUROSURGERY | Facility: CLINIC | Age: 43
End: 2018-06-22

## 2018-06-22 VITALS — HEIGHT: 73 IN | WEIGHT: 285 LBS | BODY MASS INDEX: 37.77 KG/M2 | TEMPERATURE: 97.4 F

## 2018-06-22 DIAGNOSIS — G56.03 BILATERAL CARPAL TUNNEL SYNDROME: Primary | ICD-10-CM

## 2018-06-22 DIAGNOSIS — M53.9 MULTILEVEL DEGENERATIVE DISC DISEASE: ICD-10-CM

## 2018-06-22 DIAGNOSIS — M47.812 CERVICAL SPONDYLOSIS WITHOUT MYELOPATHY: ICD-10-CM

## 2018-06-22 DIAGNOSIS — M47.819 MULTILEVEL FACET ARTHRITIS: ICD-10-CM

## 2018-06-22 DIAGNOSIS — M50.30 BULGING OF CERVICAL INTERVERTEBRAL DISC: ICD-10-CM

## 2018-06-22 PROCEDURE — 99213 OFFICE O/P EST LOW 20 MIN: CPT | Performed by: NEUROLOGICAL SURGERY

## 2018-06-22 NOTE — PROGRESS NOTES
Patient: Jin Lara  : 1975    Primary Care Provider: PHAM Brambila    Requesting Provider: As above        History    Chief Complaint:   1.  Numbness and tingling in the hands.  2.  Neck pain and headache.    History of Present Illness: Mr. Lara is a 43 year old  who I saw in May of last year with complaints of low back pain and bilateral hand numbness.  He was noted to have some degenerative change in his low back and I felt that clinically harbor carpal tunnel syndrome.  I prescribed wrist splints and those were not helpful.  He continues to complain of pain and numbness involving his hands.  This is worse with driving.  It is also worse at night.  He is right-handed but currently his left hand is bothering him more.  He has neck pain and associated headache.  He has some dysesthetic symptoms in his feet that he attributes to his diabetes.    Review of Systems   Constitutional: Negative for appetite change, chills, diaphoresis, fever and unexpected weight change.   HENT: Negative for congestion, dental problem, drooling, ear discharge, ear pain, facial swelling, hearing loss, mouth sores, nosebleeds, postnasal drip, rhinorrhea, sinus pressure, sneezing, sore throat, tinnitus, trouble swallowing and voice change.    Eyes: Negative for photophobia, pain, discharge, redness, itching and visual disturbance.   Respiratory: Negative for apnea, cough, choking, chest tightness, shortness of breath, wheezing and stridor.    Cardiovascular: Negative for chest pain, palpitations and leg swelling.   Gastrointestinal: Negative for abdominal distention, abdominal pain, anal bleeding, blood in stool, constipation, diarrhea, nausea, rectal pain and vomiting.   Endocrine: Negative for heat intolerance, polydipsia, polyphagia and polyuria.   Genitourinary: Negative for decreased urine volume, difficulty urinating, dysuria, enuresis, flank pain, frequency, genital sores, hematuria and urgency.  "  Musculoskeletal: Positive for back pain, neck pain and neck stiffness. Negative for arthralgias, gait problem, joint swelling and myalgias.   Skin: Negative for color change, pallor, rash and wound.   Allergic/Immunologic: Negative for environmental allergies, food allergies and immunocompromised state.   Neurological: Positive for headaches. Negative for dizziness, tremors, seizures, syncope, facial asymmetry, speech difficulty and light-headedness.   Hematological: Negative for adenopathy. Does not bruise/bleed easily.   Psychiatric/Behavioral: Negative for behavioral problems, confusion, decreased concentration, dysphoric mood, hallucinations, self-injury and suicidal ideas. The patient is not nervous/anxious and is not hyperactive.        The patient's past medical history, past surgical history, family history, and social history have been reviewed at length in the electronic medical record.    Physical Exam:   Temp 97.4 °F (36.3 °C)   Ht 185.4 cm (73\")   Wt 129 kg (285 lb)   BMI 37.60 kg/m²   MUSCULOSKELETAL:  Neck tenderness to palpation is not observed.   ROM in neck is normal.  NEUROLOGICAL:  Strength is intact in the upper and lower extremities to direct testing.  Muscle tone is normal throughout.  Station and gait are normal.  Sensation is intact to light touch testing throughout.  Deep tendon reflexes are 1+ and symmetrical.  Oscar's Sign is negative bilaterally.       Medical Decision Making    Data Review:   MRI of the cervical spine dated 11/3/17 demonstrates some mild diffuse degenerative change.  There is central disc-osteophyte that is slightly more leftward.  There is no cord compromise.  I once again reviewed MRIs of the lumbar and thoracic spine dated 2/7/17.  There is central prominent disc bulging that causes moderate canal narrowing at L4-5.  There is loss of signal within the L4-5 disc.  The thoracic study demonstrates some disc-osteophyte complexes in the midthoracic spine with some " canal narrowing but no cord compromise.    Diagnosis:   1.  Bilateral carpal tunnel syndrome.  2.  Cervical spondylosis with mechanical neck pain and associated headache.  3.  Lumbar and thoracic disc disease and spondylosis without radiculopathy or myelopathy.    Treatment Options:   I have referred the patient for electrodiagnostic studies of both upper extremities.  He will follow up thereafter.  He might also benefit from some physical therapy on his neck that we will discuss at follow-up.       Diagnosis Plan   1. Bilateral carpal tunnel syndrome  EMG & Nerve Conduction Test   2. Cervical spondylosis without myelopathy     3. Bulging of cervical intervertebral disc     4. Multilevel degenerative disc disease     5. Multilevel facet arthritis         Scribed for Ross Lopes MD by Lydia Morris CMA on 06/22/2018 at 9:16 AM    I, Dr. Lopes, personally performed the services described in the documentation, as scribed in my presence, and it is both accurate and complete.

## 2018-09-28 ENCOUNTER — OFFICE VISIT (OUTPATIENT)
Dept: NEUROSURGERY | Facility: CLINIC | Age: 43
End: 2018-09-28

## 2018-09-28 VITALS — TEMPERATURE: 98.2 F | HEIGHT: 73 IN | WEIGHT: 288 LBS | BODY MASS INDEX: 38.17 KG/M2

## 2018-09-28 DIAGNOSIS — M47.22 CERVICAL SPONDYLOSIS WITH RADICULOPATHY: ICD-10-CM

## 2018-09-28 DIAGNOSIS — G56.03 BILATERAL CARPAL TUNNEL SYNDROME: Primary | ICD-10-CM

## 2018-09-28 PROCEDURE — 99213 OFFICE O/P EST LOW 20 MIN: CPT | Performed by: NEUROLOGICAL SURGERY

## 2018-09-28 NOTE — PROGRESS NOTES
Patient: Jin Lara  : 1975    Primary Care Provider: Norah Zarate APRN    Requesting Provider: As above        History    Chief Complaint:   1.  Numbness and tingling in the hands.  2.  Neck pain and headache.    History of Present Illness: Mr. Lara is a 43 year old  who I saw in May of last year with complaints of low back pain and bilateral hand numbness.  He was noted to have some degenerative change in his low back and I felt that clinically harbor carpal tunnel syndrome.  I prescribed wrist splints and those were not helpful.  He continues to complain of pain and numbness involving his hands.  This is worse with driving.  It is also worse at night.  He is right-handed but currently his left hand is bothering him more.  He has neck pain and associated headache.  He has some dysesthetic symptoms in his feet that he attributes to his diabetes.    Review of Systems   Constitutional: Negative for activity change, appetite change, chills, diaphoresis, fatigue, fever and unexpected weight change.   HENT: Negative for congestion, dental problem, drooling, ear discharge, ear pain, facial swelling, hearing loss, mouth sores, nosebleeds, postnasal drip, rhinorrhea, sinus pressure, sneezing, sore throat, tinnitus, trouble swallowing and voice change.    Eyes: Negative for photophobia, pain, discharge, redness, itching and visual disturbance.   Respiratory: Negative for apnea, cough, choking, chest tightness, shortness of breath, wheezing and stridor.    Cardiovascular: Negative for chest pain, palpitations and leg swelling.   Gastrointestinal: Negative for abdominal distention, abdominal pain, anal bleeding, blood in stool, constipation, diarrhea, nausea, rectal pain and vomiting.   Endocrine: Negative for cold intolerance, heat intolerance, polydipsia, polyphagia and polyuria.   Genitourinary: Negative for decreased urine volume, difficulty urinating, dysuria, enuresis, flank pain,  "frequency, genital sores, hematuria and urgency.   Musculoskeletal: Positive for back pain, neck pain and neck stiffness. Negative for arthralgias, gait problem, joint swelling and myalgias.   Skin: Negative for color change, pallor, rash and wound.   Allergic/Immunologic: Negative for environmental allergies, food allergies and immunocompromised state.   Neurological: Positive for headaches. Negative for dizziness, tremors, seizures, syncope, facial asymmetry, speech difficulty, weakness, light-headedness and numbness.   Hematological: Negative for adenopathy. Does not bruise/bleed easily.   Psychiatric/Behavioral: Negative for agitation, behavioral problems, confusion, decreased concentration, dysphoric mood, hallucinations, self-injury, sleep disturbance and suicidal ideas. The patient is not nervous/anxious and is not hyperactive.        The patient's past medical history, past surgical history, family history, and social history have been reviewed at length in the electronic medical record.    Physical Exam:   Temp 98.2 °F (36.8 °C) (Temporal Artery )   Ht 185.4 cm (73\")   Wt 131 kg (288 lb)   BMI 38.00 kg/m²   MUSCULOSKELETAL:  Neck tenderness to palpation is not observed.   ROM in neck is normal.  NEUROLOGICAL:  Strength is intact in the upper and lower extremities to direct testing.  Muscle tone is normal throughout.  Station and gait are normal.  Sensation is intact to light touch testing throughout.    Medical Decision Making    Data Review:   MRI of the cervical spine dated 11/3/17 demonstrates some mild diffuse degenerative change.  There is central disc-osteophyte that is slightly more leftward.  There is no cord compromise.  I once again reviewed MRIs of the lumbar and thoracic spine dated 2/7/17.  There is central prominent disc bulging that causes moderate canal narrowing at L4-5.  There is loss of signal within the L4-5 disc.  The thoracic study demonstrates some disc-osteophyte complexes in the " midthoracic spine with some canal narrowing but no cord compromise.    Electrodiagnostic studies suggest moderately severe bilateral carpal tunnel syndrome.  There is no evidence of a radiculopathy.    Diagnosis:   1.  Bilateral carpal tunnel syndrome.  2.  Cervical spondylosis.    Treatment Options:   I discussed treatment options.  He does not feel that his symptoms are severe enough to warrant surgical intervention.  I have recommend that he continue use of his wrist splints.  Although they are not making his symptoms better they may keep his symptoms from progressing.  I discussed at length what carpal tunnel release would entail as well as the potential risks, complications, and limitations.  If he would like to proceed with carpal tunnel release or if his symptoms change he will contact my office.       Diagnosis Plan   1. Bilateral carpal tunnel syndrome     2. Cervical spondylosis with radiculopathy       Scribed for Ross Lopes MD by Genet Moore CMA on 09/28/2018 at 9:15 AM        I, Dr. Lopes, personally performed the services described in the documentation, as scribed in my presence, and it is both accurate and complete.

## 2019-08-16 ENCOUNTER — HOSPITAL ENCOUNTER (OUTPATIENT)
Dept: HOSPITAL 22 - RT | Age: 44
End: 2019-08-16
Payer: COMMERCIAL

## 2019-08-16 DIAGNOSIS — R00.2: Primary | ICD-10-CM

## 2019-08-16 PROCEDURE — 93225 XTRNL ECG REC<48 HRS REC: CPT

## 2019-08-16 PROCEDURE — 93226 XTRNL ECG REC<48 HR SCAN A/R: CPT

## 2019-09-10 ENCOUNTER — HOSPITAL ENCOUNTER (OUTPATIENT)
Age: 44
End: 2019-09-10
Payer: COMMERCIAL

## 2019-09-10 DIAGNOSIS — R07.9: Primary | ICD-10-CM

## 2019-09-10 PROCEDURE — 93270 REMOTE 30 DAY ECG REV/REPORT: CPT

## 2019-09-23 ENCOUNTER — HOSPITAL ENCOUNTER (OUTPATIENT)
Age: 44
End: 2019-09-23
Payer: COMMERCIAL

## 2019-09-23 DIAGNOSIS — Z95.5: Primary | ICD-10-CM

## 2019-09-23 LAB
ANION GAP SERPL CALC-SCNC: 15.3 MEQ/L (ref 5–15)
BUN SERPL-MCNC: 29 MG/DL (ref 7–18)
CALCIUM SPEC-MCNC: 9.1 MG/DL (ref 8.5–10.1)
CHLORIDE SPEC-SCNC: 99 MMOL/L (ref 98–107)
CO2 SERPL-SCNC: 23 MMOL/L (ref 21–32)
CREAT BLD-SCNC: 1.16 MG/DL (ref 0.7–1.3)
ESTIMATED GLOMERULAR FILT RATE: 69 ML/MIN (ref 60–?)
GFR (AFRICAN AMERICAN): 84 ML/MIN (ref 60–?)
GLUCOSE: 208 MG/DL (ref 74–106)
HCT VFR BLD CALC: 48.8 % (ref 42–52)
HGB BLD-MCNC: 15.7 G/DL (ref 14.1–18)
MCHC RBC-ENTMCNC: 32.2 G/DL (ref 31.8–35.4)
MCV RBC: 86.4 FL (ref 80–94)
MEAN CORPUSCULAR HEMOGLOBIN: 27.8 PG (ref 27–31.2)
PLATELET # BLD: 273 K/MM3 (ref 142–424)
POTASSIUM: 4.3 MMOL/L (ref 3.5–5.1)
RBC # BLD AUTO: 5.65 M/MM3 (ref 4.6–6.2)
SODIUM SPEC-SCNC: 133 MMOL/L (ref 136–145)
WBC # BLD AUTO: 7.1 K/MM3 (ref 4.8–10.8)

## 2019-09-23 PROCEDURE — 85025 COMPLETE CBC W/AUTO DIFF WBC: CPT

## 2019-09-23 PROCEDURE — 36415 COLL VENOUS BLD VENIPUNCTURE: CPT

## 2019-09-23 PROCEDURE — 80048 BASIC METABOLIC PNL TOTAL CA: CPT

## 2019-12-09 ENCOUNTER — ON CAMPUS - OUTPATIENT (OUTPATIENT)
Dept: RURAL HOSPITAL 6 | Facility: HOSPITAL | Age: 44
End: 2019-12-09
Payer: COMMERCIAL

## 2019-12-09 DIAGNOSIS — R13.10 DYSPHAGIA, UNSPECIFIED: ICD-10-CM

## 2019-12-09 DIAGNOSIS — K29.50 UNSPECIFIED CHRONIC GASTRITIS WITHOUT BLEEDING: ICD-10-CM

## 2019-12-09 DIAGNOSIS — K21.0 GASTRO-ESOPHAGEAL REFLUX DISEASE WITH ESOPHAGITIS: ICD-10-CM

## 2019-12-09 DIAGNOSIS — K22.2 ESOPHAGEAL OBSTRUCTION: ICD-10-CM

## 2019-12-09 PROCEDURE — 43239 EGD BIOPSY SINGLE/MULTIPLE: CPT | Mod: 59 | Performed by: INTERNAL MEDICINE

## 2019-12-09 PROCEDURE — 43249 ESOPH EGD DILATION <30 MM: CPT | Performed by: INTERNAL MEDICINE

## 2020-05-29 ENCOUNTER — ON CAMPUS - OUTPATIENT (OUTPATIENT)
Dept: RURAL HOSPITAL 6 | Facility: HOSPITAL | Age: 45
End: 2020-05-29

## 2020-05-29 DIAGNOSIS — K51.40 INFLAMMATORY POLYPS OF COLON WITHOUT COMPLICATIONS: ICD-10-CM

## 2020-05-29 DIAGNOSIS — R19.4 CHANGE IN BOWEL HABIT: ICD-10-CM

## 2020-05-29 DIAGNOSIS — Z83.71 FAMILY HISTORY OF COLONIC POLYPS: ICD-10-CM

## 2020-05-29 DIAGNOSIS — K57.90 DIVERTICULOSIS OF INTESTINE, PART UNSPECIFIED, WITHOUT PERFO: ICD-10-CM

## 2020-05-29 DIAGNOSIS — R15.0 INCOMPLETE DEFECATION: ICD-10-CM

## 2020-05-29 DIAGNOSIS — D12.3 BENIGN NEOPLASM OF TRANSVERSE COLON: ICD-10-CM

## 2020-05-29 PROCEDURE — 45385 COLONOSCOPY W/LESION REMOVAL: CPT | Performed by: INTERNAL MEDICINE

## (undated) DEVICE — PK CYSTO-TUR BASIC 10

## (undated) DEVICE — GLV SURG SENSICARE MICRO PF LF 7.5 STRL

## (undated) DEVICE — ELECTRD KNIF RT/ANGL

## (undated) DEVICE — DRAINBAG,ANTI-REFLUX TOWER,L/F,2000ML,LL: Brand: MEDLINE

## (undated) DEVICE — HLDR CATH FOLEY STATLOCK TRICOT PED 3WY

## (undated) DEVICE — EVAC BLDR UROVAC W ADAPT

## (undated) DEVICE — AIRWY 90MM NO9

## (undated) DEVICE — MEDI-VAC NON-CONDUCTIVE SUCTION TUBING: Brand: CARDINAL HEALTH

## (undated) DEVICE — CANNULA,OXY,ADULT,SUPERSOFT,W/7'TUB,UC: Brand: MEDLINE

## (undated) DEVICE — CORD BOVIE 1P/U

## (undated) DEVICE — PAD GRND REM POLYHESIVE A/ DISP

## (undated) DEVICE — MEDI-VAC YANKAUER SUCTION HANDLE W/BULBOUS TIP: Brand: CARDINAL HEALTH